# Patient Record
Sex: MALE | Race: WHITE | URBAN - METROPOLITAN AREA
[De-identification: names, ages, dates, MRNs, and addresses within clinical notes are randomized per-mention and may not be internally consistent; named-entity substitution may affect disease eponyms.]

---

## 2018-03-23 ENCOUNTER — HOSPITAL ENCOUNTER (EMERGENCY)
Facility: CLINIC | Age: 28
Discharge: HOME | End: 2018-03-23
Attending: EMERGENCY MEDICINE | Admitting: EMERGENCY MEDICINE
Payer: COMMERCIAL

## 2018-03-23 VITALS
SYSTOLIC BLOOD PRESSURE: 122 MMHG | HEART RATE: 89 BPM | RESPIRATION RATE: 18 BRPM | OXYGEN SATURATION: 99 % | TEMPERATURE: 97.9 F | DIASTOLIC BLOOD PRESSURE: 74 MMHG

## 2018-03-23 DIAGNOSIS — R55 VASOVAGAL NEAR SYNCOPE: ICD-10-CM

## 2018-03-23 LAB
ANION GAP SERPL CALCULATED.3IONS-SCNC: 4 MMOL/L (ref 3–14)
BASOPHILS # BLD AUTO: 0 10E9/L (ref 0–0.2)
BASOPHILS NFR BLD AUTO: 0.5 %
BUN SERPL-MCNC: 11 MG/DL (ref 7–30)
CALCIUM SERPL-MCNC: 8.9 MG/DL (ref 8.5–10.1)
CHLORIDE SERPL-SCNC: 108 MMOL/L (ref 94–109)
CO2 SERPL-SCNC: 29 MMOL/L (ref 20–32)
CREAT SERPL-MCNC: 0.81 MG/DL (ref 0.66–1.25)
DIFFERENTIAL METHOD BLD: NORMAL
EOSINOPHIL # BLD AUTO: 0.2 10E9/L (ref 0–0.7)
EOSINOPHIL NFR BLD AUTO: 3.6 %
ERYTHROCYTE [DISTWIDTH] IN BLOOD BY AUTOMATED COUNT: 12.2 % (ref 10–15)
GFR SERPL CREATININE-BSD FRML MDRD: >90 ML/MIN/1.7M2
GLUCOSE SERPL-MCNC: 94 MG/DL (ref 70–99)
HCT VFR BLD AUTO: 45.1 % (ref 40–53)
HGB BLD-MCNC: 15.5 G/DL (ref 13.3–17.7)
IMM GRANULOCYTES # BLD: 0 10E9/L (ref 0–0.4)
IMM GRANULOCYTES NFR BLD: 0.2 %
INTERPRETATION ECG - MUSE: NORMAL
LYMPHOCYTES # BLD AUTO: 1.9 10E9/L (ref 0.8–5.3)
LYMPHOCYTES NFR BLD AUTO: 31.7 %
MCH RBC QN AUTO: 30.3 PG (ref 26.5–33)
MCHC RBC AUTO-ENTMCNC: 34.4 G/DL (ref 31.5–36.5)
MCV RBC AUTO: 88 FL (ref 78–100)
MONOCYTES # BLD AUTO: 0.4 10E9/L (ref 0–1.3)
MONOCYTES NFR BLD AUTO: 6.8 %
NEUTROPHILS # BLD AUTO: 3.4 10E9/L (ref 1.6–8.3)
NEUTROPHILS NFR BLD AUTO: 57.2 %
NRBC # BLD AUTO: 0 10*3/UL
NRBC BLD AUTO-RTO: 0 /100
PLATELET # BLD AUTO: 220 10E9/L (ref 150–450)
POTASSIUM SERPL-SCNC: 3.8 MMOL/L (ref 3.4–5.3)
RBC # BLD AUTO: 5.12 10E12/L (ref 4.4–5.9)
SODIUM SERPL-SCNC: 141 MMOL/L (ref 133–144)
WBC # BLD AUTO: 5.9 10E9/L (ref 4–11)

## 2018-03-23 PROCEDURE — 96360 HYDRATION IV INFUSION INIT: CPT

## 2018-03-23 PROCEDURE — 80048 BASIC METABOLIC PNL TOTAL CA: CPT | Performed by: EMERGENCY MEDICINE

## 2018-03-23 PROCEDURE — 85025 COMPLETE CBC W/AUTO DIFF WBC: CPT | Performed by: EMERGENCY MEDICINE

## 2018-03-23 PROCEDURE — 96361 HYDRATE IV INFUSION ADD-ON: CPT

## 2018-03-23 PROCEDURE — 93005 ELECTROCARDIOGRAM TRACING: CPT

## 2018-03-23 PROCEDURE — 99284 EMERGENCY DEPT VISIT MOD MDM: CPT | Mod: 25

## 2018-03-23 PROCEDURE — 25000128 H RX IP 250 OP 636: Performed by: EMERGENCY MEDICINE

## 2018-03-23 RX ADMIN — SODIUM CHLORIDE 1000 ML: 9 INJECTION, SOLUTION INTRAVENOUS at 14:12

## 2018-03-23 ASSESSMENT — ENCOUNTER SYMPTOMS
WEAKNESS: 1
HEADACHES: 0
DIAPHORESIS: 0
PALPITATIONS: 0
NUMBNESS: 1

## 2018-03-23 NOTE — ED NOTES
Ambulated pt around ED, steady gate, no SOB, no dizziness, no symptoms of lightheadedness, MD notified

## 2018-03-23 NOTE — ED NOTES
Patient comes in for evaluation of altered mental status. Patient states 40min PTA he was sitting in bed and suddenly felt numb all over and like he was going to pass out, vision blurred, lasted about 15 to 20 min. ABCs intact. Feels normal now.

## 2018-03-23 NOTE — ED AVS SNAPSHOT
" [facility]    [address] [place] [address]    Phone:  [phone]    Fax:  [phone]                                       Seth Mckeon   MRN: [id number]    Department:  [facility]   Date of Visit:  3/23/2018           Patient Information     Date Of Birth          1990        Your diagnoses for this visit were:     Vasovagal near syncope        You were seen by Kameron Jin MD.      Follow-up Information     Schedule an appointment as soon as possible for a visit with No Ref-Primary, Physician.        Follow up with [facility].    Specialty:  EMERGENCY MEDICINE    Why:  if any recurrent episodes, chest pain, shortness of breath, racing heart beat, fainting    Contact information:    [address] [place] [address]  [phone]        Discharge Instructions         Near-Fainting: Vagal Reaction  Fainting (syncope) is a temporary loss of consciousness (passing out). It is associated with a loss of postural tone. Postural tone is the constant contraction of the muscles in your body to help keep your body upright. It also helps blood return towards the heart and brain. Syncope occurs when there is reduced blood flow to the brain due to this common vagal reaction. A vagal reaction is a reflex response that causes a sudden drop in your blood pressure, and your pulse to slow down. If the pulse is low enough, the blood pressure falls and causes fainting or near-fainting. Lying down usually stops the reaction very quickly.  These are symptoms of near-fainting:    Feeling lightheaded or like you are going to faint    Weak pulse    Nausea    Sweating    Blurred vision or feeling like your vision is \"blacking out\"    Palpitations    Chest pain    Trouble breathing    Cool and clammy skin  Causes for near-fainting include:    Sudden emotional stress like fear, pain, " panic, sight of blood    Straining or overexertion, straining while using the toilet, coughing, sneezing    Standing up too quickly, or standing up for too long a time    Pregnancy  Home care  The following will help you care for yourself at home:    Rest today and go back to your normal activities as soon as you are feeling back to normal.    If you become light-headed or dizzy, lie down right away or sit with your head lowered between your knees.    Stay hydrated and do not skip meals.    Avoid prolonged standing and hot places    Do what you can to prevent constipation. If you bear down excessively when trying to have a bowel movement, this can trigger a vagal response  There may be other causes for a vagal response and near-syncope. For example, this can happen after open-heart surgery when the heart muscle is inflamed and irritated.  Check with your doctor to see if there is testing you need such as a tilt-table test, heart rhythm monitoring, or blood tests. Review the medicines you take with your healthcare provider and pharmacist to be sure the symptoms you have are not a side effect of a medicine.  Follow-up care  Follow up with your healthcare provider, or as advised.   If you are having frequent episodes of near-syncope or vagal reactions, be cautious about activities such as driving that could harm yourself or others if you were to faint. Do not drive or operate heavy machinery if you are feeling like you may faint.  Call 911  Call 911 if any of these occur:    Another fainting spell occurs, and it is not explained by the common causes listed above    Fainting or loss of consciousness    Chest, arm, neck, jaw, back, or abdominal pain    Shortness of breath    Weakness, tingling, or numbness in one side of the face, one arm or leg    Slurred speech, confusion, trouble walking or seeing    Seizure    Blood in vomit or stools (black or red color)  When to seek medical advice  Call your healthcare provider  right away if you have occasional mild lightheadedness, especially when standing up.  Date Last Reviewed: 6/1/2016 2000-2017 [employer]. [address] [place] [address], [place] [address]. All rights reserved. This information is not intended as a substitute for professional medical care. Always follow your healthcare professional's instructions.          24 Hour Appointment Hotline       To make an appointment at any [facility], call [phone] ([phone]). If you don't have a family doctor or clinic, we will help you find one. [facility] are conveniently located to serve the needs of you and your family.             Review of your medicines      Our records show that you are taking the medicines listed below. If these are incorrect, please call your family doctor or clinic.        Dose / Directions Last dose taken    ADVIL PM PO        Refills:  0                Procedures and tests performed during your visit     Basic metabolic panel (BMP)    CBC + differential    EKG 12 lead    IV access      Orders Needing Specimen Collection     None      Pending Results     No orders found from 3/21/2018 to 3/24/2018.            Pending Culture Results     No orders found from 3/21/2018 to 3/24/2018.            Pending Results Instructions     If you had any lab results that were not finalized at the time of your Discharge, you can call the ED Lab Result RN at [phone]. You will be contacted by this team for any positive Lab results or changes in treatment. The nurses are available 7 days a week from 10A to 6:30P.  You can leave a message 24 hours per day and they will return your call.        Test Results From Your Hospital Stay        3/23/2018  1:05 PM      Component Results     Component Value Ref Range & Units Status    WBC 5.9 4.0 - 11.0 10e9/L Final    RBC Count 5.12 4.4 - 5.9 10e12/L Final    Hemoglobin 15.5 13.3 - 17.7 g/dL Final    Hematocrit 45.1 40.0 - 53.0 % Final    MCV 88 78 -  100 fl Final    MCH 30.3 26.5 - 33.0 pg Final    MCHC 34.4 31.5 - 36.5 g/dL Final    RDW 12.2 10.0 - 15.0 % Final    Platelet Count 220 150 - 450 10e9/L Final    Diff Method Automated Method  Final    % Neutrophils 57.2 % Final    % Lymphocytes 31.7 % Final    % Monocytes 6.8 % Final    % Eosinophils 3.6 % Final    % Basophils 0.5 % Final    % Immature Granulocytes 0.2 % Final    Nucleated RBCs 0 0 /100 Final    Absolute Neutrophil 3.4 1.6 - 8.3 10e9/L Final    Absolute Lymphocytes 1.9 0.8 - 5.3 10e9/L Final    Absolute Monocytes 0.4 0.0 - 1.3 10e9/L Final    Absolute Eosinophils 0.2 0.0 - 0.7 10e9/L Final    Absolute Basophils 0.0 0.0 - 0.2 10e9/L Final    Abs Immature Granulocytes 0.0 0 - 0.4 10e9/L Final    Absolute Nucleated RBC 0.0  Final         3/23/2018  1:22 PM      Component Results     Component Value Ref Range & Units Status    Sodium 141 133 - 144 mmol/L Final    Potassium 3.8 3.4 - 5.3 mmol/L Final    Chloride 108 94 - 109 mmol/L Final    Carbon Dioxide 29 20 - 32 mmol/L Final    Anion Gap 4 3 - 14 mmol/L Final    Glucose 94 70 - 99 mg/dL Final    Urea Nitrogen 11 7 - 30 mg/dL Final    Creatinine 0.81 0.66 - 1.25 mg/dL Final    GFR Estimate >90 >60 mL/min/1.7m2 Final    Non  GFR Calc    GFR Estimate If Black >90 >60 mL/min/1.7m2 Final    African American GFR Calc    Calcium 8.9 8.5 - 10.1 mg/dL Final                Clinical Quality Measure: Blood Pressure Screening     Your blood pressure was checked while you were in the emergency department today. The last reading we obtained was  BP: 122/74 . Please read the guidelines below about what these numbers mean and what you should do about them.  If your systolic blood pressure (the top number) is less than 120 and your diastolic blood pressure (the bottom number) is less than 80, then your blood pressure is normal. There is nothing more that you need to do about it.  If your systolic blood pressure (the top number) is 120-139 or your  "diastolic blood pressure (the bottom number) is 80-89, your blood pressure may be higher than it should be. You should have your blood pressure rechecked within a year by a primary care provider.  If your systolic blood pressure (the top number) is 140 or greater or your diastolic blood pressure (the bottom number) is 90 or greater, you may have high blood pressure. High blood pressure is treatable, but if left untreated over time it can put you at risk for heart attack, stroke, or kidney failure. You should have your blood pressure rechecked by a primary care provider within the next 4 weeks.  If your provider in the emergency department today gave you specific instructions to follow-up with your doctor or provider even sooner than that, you should follow that instruction and not wait for up to 4 weeks for your follow-up visit.        Thank you for choosing [facility]       Thank you for choosing [facility] for your care. Our goal is always to provide you with excellent care. Hearing back from our patients is one way we can continue to improve our services. Please take a few minutes to complete the written survey that you may receive in the mail after you visit with us. Thank you!        [employer] Information     [employer] lets you send messages to your doctor, view your test results, renew your prescriptions, schedule appointments and more. To sign up, go to [email or web address] . Click on \"Log in\" on the left side of the screen, which will take you to the Welcome page. Then click on \"Sign up Now\" on the right side of the page.     You will be asked to enter the access code listed below, as well as some personal information. Please follow the directions to create your username and password.     Your access code is: [email or web address]  Expires: 2018  4:19 PM     Your access code will  in 90 days. If you need help or a new code, please call your [facility] clinic or [phone].        Care EveryWhere ID     This is " your Care EveryWhere ID. This could be used by other organizations to access your [facility] medical records  [id number]        Equal Access to Services     YOSHI VALDOVINOS : Chica Humphries, terrie vargas, nakul sidhu, opal sullivan. So [facility] [phone].    ATENCIÓN: Si habla español, tiene a bingham disposición servicios gratuitos de asistencia lingüística. Llame al [phone].    We comply with applicable federal civil rights laws and [place] laws. We do not discriminate on the basis of race, color, national origin, age, disability, sex, sexual orientation, or gender identity.            After Visit Summary       This is your record. Keep this with you and show to your community pharmacist(s) and doctor(s) at your next visit.

## 2018-03-23 NOTE — ED AVS SNAPSHOT
[facility] [place] [facility]    [address]    [facility] [address]    Phone:  [phone]    Fax:  [phone]                                       Seth Mckeon   MRN: [id number]    Department:  [facility] [place] [facility]   Date of Visit:  3/23/2018           After Visit Summary Signature Page     I have received my discharge instructions, and my questions have been answered. I have discussed any challenges I see with this plan with the nurse or doctor.    ..........................................................................................................................................  Patient/Patient Representative Signature      ..........................................................................................................................................  Patient Representative Print Name and Relationship to Patient    ..................................................               ................................................  Date                                            Time    ..........................................................................................................................................  Reviewed by Signature/Title    ...................................................              ..............................................  Date                                                            Time

## 2018-03-23 NOTE — DISCHARGE INSTRUCTIONS
"  Near-Fainting: Vagal Reaction  Fainting (syncope) is a temporary loss of consciousness (passing out). It is associated with a loss of postural tone. Postural tone is the constant contraction of the muscles in your body to help keep your body upright. It also helps blood return towards the heart and brain. Syncope occurs when there is reduced blood flow to the brain due to this common vagal reaction. A vagal reaction is a reflex response that causes a sudden drop in your blood pressure, and your pulse to slow down. If the pulse is low enough, the blood pressure falls and causes fainting or near-fainting. Lying down usually stops the reaction very quickly.  These are symptoms of near-fainting:    Feeling lightheaded or like you are going to faint    Weak pulse    Nausea    Sweating    Blurred vision or feeling like your vision is \"blacking out\"    Palpitations    Chest pain    Trouble breathing    Cool and clammy skin  Causes for near-fainting include:    Sudden emotional stress like fear, pain, panic, sight of blood    Straining or overexertion, straining while using the toilet, coughing, sneezing    Standing up too quickly, or standing up for too long a time    Pregnancy  Home care  The following will help you care for yourself at home:    Rest today and go back to your normal activities as soon as you are feeling back to normal.    If you become light-headed or dizzy, lie down right away or sit with your head lowered between your knees.    Stay hydrated and do not skip meals.    Avoid prolonged standing and hot places    Do what you can to prevent constipation. If you bear down excessively when trying to have a bowel movement, this can trigger a vagal response  There may be other causes for a vagal response and near-syncope. For example, this can happen after open-heart surgery when the heart muscle is inflamed and irritated.  Check with your doctor to see if there is testing you need such as a tilt-table test, " heart rhythm monitoring, or blood tests. Review the medicines you take with your healthcare provider and pharmacist to be sure the symptoms you have are not a side effect of a medicine.  Follow-up care  Follow up with your healthcare provider, or as advised.   If you are having frequent episodes of near-syncope or vagal reactions, be cautious about activities such as driving that could harm yourself or others if you were to faint. Do not drive or operate heavy machinery if you are feeling like you may faint.  Call 911  Call 911 if any of these occur:    Another fainting spell occurs, and it is not explained by the common causes listed above    Fainting or loss of consciousness    Chest, arm, neck, jaw, back, or abdominal pain    Shortness of breath    Weakness, tingling, or numbness in one side of the face, one arm or leg    Slurred speech, confusion, trouble walking or seeing    Seizure    Blood in vomit or stools (black or red color)  When to seek medical advice  Call your healthcare provider right away if you have occasional mild lightheadedness, especially when standing up.  Date Last Reviewed: 6/1/2016 2000-2017 [employer]. [address] [place] [address]. All rights reserved. This information is not intended as a substitute for professional medical care. Always follow your healthcare professional's instructions.

## 2020-01-15 NOTE — ED PROVIDER NOTES
History     Chief Complaint:    Near syncope      HPI   Seth Mckeon is a 27 year old male who presents to the ED today with a near syncopal episode. The patient states that at around 1210, he was sitting in his bed when he started to feel some numbness to his body. He states that it was originally all over his body, including both of his arms and legs. He then started to have some numbness only to the right side of his body and described it as if his leg was asleep. He notes that his legs were crossed right before his symptoms started. He stayed down in his bed and states that it took him about 5 minutes to be able to stand up after his symptoms started. The patient states that he felt as if he was having some trouble talking soon after too, but is unsure if this was due to his symptoms or if he just could not find the right words to describe his symptoms when he was on the phone with the nurse's line today. He reports to some nausea, tunnel and blurred vision, but denies any diaphoresis, headache, chest pain, or palpitations. He denies any facial droop or any facial weakness or numbness. He states that he ate earlier today and has never had an episode like this before. He states that he did not actually have a syncopal episode today.      Allergies:  Suppress A Pediatric    Medications:    Advil     Past Medical History:    History reviewed.  No significant past medical history.      Past Surgical History:    History reviewed. No pertinent past surgical history.     Family History:    Asthma - mother     Social History:  Tobacco Use: current every day smoker (0.50 packs a day)  Alcohol Use: yes     Review of Systems   Constitutional: Negative for diaphoresis.   Cardiovascular: Negative for chest pain and palpitations.   Neurological: Positive for weakness and numbness. Negative for headaches.   All other systems reviewed and are negative.      Physical Exam   First Vitals:  BP: (!) 156/100  Pulse: 89  Temp:  wsmokes 1 ppd, smoking cessation discussed    97.9  F (36.6  C)  Resp: 18  SpO2: 97 %      Physical Exam  Vital signs and nursing notes reviewed.     Constitutional: laying on gurney appears comfortable  HENT: Oropharynx is clear and moist  Eyes: Conjunctivae are normal bilaterally. Pupils equal  Neck: normal range of motion  Cardiovascular: Normal rate, regular rhythm, normal heart sounds, no murmurs. Equal radial pulses.   Pulmonary/Chest: Effort normal and breath sounds normal. No respiratory distress.   Abdominal: Soft. Bowel sounds are normal. No tenderness to palpation. No rebound or guarding.   Musculoskeletal: No joint swelling or edema.   Neurological: Alert and oriented. No focal weakness. No visual peripheral field defects. No facial motor weakness. No  paresthesias in the arms or legs. Normal strength. Brisk, deep tendon reflexes.   Skin: Skin is warm and dry. No rash noted.   Psych: normal affect     Emergency Department Course   ECG:  @ 1235  Indication: near syncope   Vent. Rate 79 bpm. AL interval 146 ms. QRS duration 94 ms. QT/QTc 344/394 ms. P-R-T axis 44 23 36.   Normals inus rhythm.  Normal ECG.   Read @ 1237 by Dr. Jin.     Laboratory:  CBC:  WBC 5.9, HGB 15.5, , otherwise WNL   BMP: WNL (Creatinine 0.81)     Interventions:  (1412) Normal Saline, 1 liter, IV bolus      Emergency Department Course:  Nursing notes and vitals reviewed.  (6464) I performed an exam of the patient as documented above.    EKG was done, interpretation as above.   A peripheral IV was established. Blood was drawn from the patient. This was sent for laboratory testing, findings above.    Findings and plan explained to the patient. Patient discharged home with instructions regarding supportive care, medications, and reasons to return. The importance of close follow-up was reviewed.  I personally reviewed the laboratory results with the patient and answered all related questions prior to discharge.    Impression & Plan    Medical Decision Making:  Seth  Mike is a 27 year old male who presents with what sounds like an almost vasovagal type episode. On my evaluation, his symptoms had resolved. He has normal vital signs. He has no risk factors for CVA or intracranial abnormality. He has no headache and a normal neuro exam. His lab tests are unremarkable. He denies any history of palpitations and his EKG and cardiac monitoring showed no evidence of irregular heart rhythms. He was given IV fluids. Orthostatics were obtained, which were normal. He was also ambulated without symptomatology. There is no clear etiology of his symptoms. It could be a vasovagal type response. I feel that he can be safely discharged home with close monitoring of his symptoms and if he has any recurrent episodes, fainting, chest pain, vision disturbance, to return here. Patient understands this and was discharged home.     Diagnosis:    ICD-10-CM    1. Vasovagal near syncope R55        Disposition:  discharged to home    Deepika STOLL, am serving as a scribe on 3/23/2018 at 12:54 PM to personally document services performed by Dr. Jin based on my observations and the provider's statements to me.    3/23/2018   [facility] EMERGENCY DEPARTMENT       Kameron Jin MD  03/24/18 0829